# Patient Record
Sex: MALE | Race: WHITE | Employment: FULL TIME | URBAN - METROPOLITAN AREA
[De-identification: names, ages, dates, MRNs, and addresses within clinical notes are randomized per-mention and may not be internally consistent; named-entity substitution may affect disease eponyms.]

---

## 2019-06-22 ENCOUNTER — HOSPITAL ENCOUNTER (EMERGENCY)
Age: 34
Discharge: HOME | End: 2019-06-22
Attending: EMERGENCY MEDICINE
Payer: COMMERCIAL

## 2019-06-22 VITALS
HEIGHT: 72 IN | SYSTOLIC BLOOD PRESSURE: 132 MMHG | RESPIRATION RATE: 16 BRPM | BODY MASS INDEX: 28.44 KG/M2 | HEART RATE: 72 BPM | OXYGEN SATURATION: 100 % | TEMPERATURE: 97.8 F | WEIGHT: 210 LBS | DIASTOLIC BLOOD PRESSURE: 89 MMHG

## 2019-06-22 DIAGNOSIS — G89.29 CHRONIC RIGHT-SIDED LOW BACK PAIN WITH RIGHT-SIDED SCIATICA: Primary | ICD-10-CM

## 2019-06-22 DIAGNOSIS — M54.41 CHRONIC RIGHT-SIDED LOW BACK PAIN WITH RIGHT-SIDED SCIATICA: Primary | ICD-10-CM

## 2019-06-22 PROCEDURE — 96372 THER/PROPH/DIAG INJ SC/IM: CPT

## 2019-06-22 PROCEDURE — 6360000002 HC RX W HCPCS: Performed by: EMERGENCY MEDICINE

## 2019-06-22 PROCEDURE — 6370000000 HC RX 637 (ALT 250 FOR IP): Performed by: EMERGENCY MEDICINE

## 2019-06-22 PROCEDURE — 99283 EMERGENCY DEPT VISIT LOW MDM: CPT

## 2019-06-22 RX ORDER — ORPHENADRINE CITRATE 30 MG/ML
60 INJECTION INTRAMUSCULAR; INTRAVENOUS ONCE
Status: COMPLETED | OUTPATIENT
Start: 2019-06-22 | End: 2019-06-22

## 2019-06-22 RX ORDER — METHYLPREDNISOLONE 4 MG/1
TABLET ORAL
Qty: 1 KIT | Refills: 0 | Status: SHIPPED | OUTPATIENT
Start: 2019-06-22

## 2019-06-22 RX ORDER — CYCLOBENZAPRINE HCL 10 MG
10 TABLET ORAL 2 TIMES DAILY PRN
Qty: 20 TABLET | Refills: 0 | Status: SHIPPED | OUTPATIENT
Start: 2019-06-22 | End: 2019-07-02

## 2019-06-22 RX ORDER — OXYCODONE HYDROCHLORIDE AND ACETAMINOPHEN 5; 325 MG/1; MG/1
1 TABLET ORAL ONCE
Status: COMPLETED | OUTPATIENT
Start: 2019-06-22 | End: 2019-06-22

## 2019-06-22 RX ORDER — LIDOCAINE 4 G/G
1 PATCH TOPICAL DAILY
Qty: 30 PATCH | Refills: 0 | Status: SHIPPED | OUTPATIENT
Start: 2019-06-22 | End: 2019-07-22

## 2019-06-22 RX ORDER — LIDOCAINE 4 G/G
1 PATCH TOPICAL ONCE
Status: DISCONTINUED | OUTPATIENT
Start: 2019-06-22 | End: 2019-06-22 | Stop reason: HOSPADM

## 2019-06-22 RX ADMIN — ORPHENADRINE CITRATE 60 MG: 30 INJECTION INTRAMUSCULAR; INTRAVENOUS at 17:48

## 2019-06-22 RX ADMIN — OXYCODONE HYDROCHLORIDE AND ACETAMINOPHEN 1 TABLET: 5; 325 TABLET ORAL at 17:48

## 2019-06-22 ASSESSMENT — ENCOUNTER SYMPTOMS
VOICE CHANGE: 0
BOWEL INCONTINENCE: 0
ABDOMINAL SWELLING: 0
BACK PAIN: 1
TROUBLE SWALLOWING: 0
SHORTNESS OF BREATH: 0
WHEEZING: 0

## 2019-06-22 ASSESSMENT — PAIN SCALES - GENERAL
PAINLEVEL_OUTOF10: 9
PAINLEVEL_OUTOF10: 9
PAINLEVEL_OUTOF10: 10

## 2019-06-22 NOTE — ED NOTES
Pt able to sit up/maneuver to standing position take some steps around the room. Pt states \"I'm still in a lot of pain\". Dr Margaret Alpers made aware.      Jame Alvarenga, RAYMONDN  33/18/84 [id number]

## 2019-06-22 NOTE — ED NOTES
Pt scripts x3 instructed to follow up with [facility].  Assessed per Dr Bryan Barba, KENJI  76/01/07 [id number]

## 2019-06-22 NOTE — ED PROVIDER NOTES
[address] [facility]  ED  eMERGENCY dEPARTMENT eNCOUnter      Pt Name: Elisha Ryder  MRN: [id number]  [place] 1985  Date of evaluation: 6/22/2019  Provider: Angélica Jha MD    CHIEF COMPLAINT       Chief Complaint   Patient presents with    Back Pain     Pt sts he had chronic back pain, gets steroid injections at [facility]. Sts he went to  something today, tweaked his back, laid down and was unable to get up. HISTORY OF PRESENT ILLNESS   (Location/Symptom, Timing/Onset, Context/Setting, Quality, Duration, Modifying Factors, Severity)  Note limiting factors. The history is provided by the patient. Back Pain   Location:  Lumbar spine (right sided)  Quality: \"sharp\"  Radiates to:  R thigh  Pain severity:  Severe  Pain is: Worse during the day  Onset quality:  Sudden  Duration:  6 hours  Timing:  Constant  Progression:  Worsening  Chronicity:  Recurrent  Context comment:  Chronic back pain. Bent over to pick something up and felt sudden pain  Relieved by:  Nothing  Worsened by:  Bending  Ineffective treatments: 800mg of Ibuprofen x 2. Associated symptoms: no abdominal swelling, no bladder incontinence, no bowel incontinence, no chest pain, no fever and no perianal numbness        Nursing Notes were reviewed. REVIEW OFSYSTEMS    (2-9 systems for level 4, 10 or more for level 5)     Review of Systems   Constitutional: Negative for fever. HENT: Negative for drooling, trouble swallowing and voice change. Eyes: Negative for visual disturbance. Respiratory: Negative for shortness of breath and wheezing. Cardiovascular: Negative for chest pain and palpitations. Gastrointestinal: Negative for bowel incontinence. Genitourinary: Negative for bladder incontinence. Musculoskeletal: Positive for back pain. Neurological: Negative for seizures and syncope. Psychiatric/Behavioral: Negative for self-injury and suicidal ideas.        Except as noted above the °F (36.6 °C) Oral 72 16 100 % 6' (1.829 m) 210 lb (95.3 kg)       Physical Exam   Constitutional: He is oriented to person, place, and time. He appears well-developed and well-nourished. No distress. HENT:   Head: Normocephalic and atraumatic. Eyes: Conjunctivae and EOM are normal.   Neck: No JVD present. No tracheal deviation present. Cardiovascular: Normal rate and intact distal pulses. Pulmonary/Chest: Effort normal. No respiratory distress. Abdominal: Soft. Bowel sounds are normal. There is no tenderness (no tenderness to abdomen on palpation). Musculoskeletal: He exhibits tenderness (right paraspinal lumbar tenderness. No midline tenderness or rash to suggest shingles). He exhibits no edema or deformity. Neurological: He is alert and oriented to person, place, and time. No saddle anesthesia    Skin: Skin is warm and dry. Capillary refill takes less than 2 seconds. Nursing note and vitals reviewed. EMERGENCY DEPARTMENT COURSE and DIFFERENTIAL DIAGNOSIS/MDM:   Vitals:    Vitals:    06/22/19 1728 06/22/19 1834   BP: (!) 110/58 132/89   Pulse: 72    Resp: 16    Temp: 97.8 °F (36.6 °C)    TempSrc: Oral    SpO2: 100%    Weight: 210 lb (95.3 kg)    Height: 6' (1.829 m)          MDM  The patient is afebrile, nontoxic-appearing, in no acute distress. No red flag symptoms such as fever, midline pain, acute trauma, IV drug abuse, urinary or bowel incontinence, saddle anesthesia. After pain medication the patient is able to ambulate although he does still report pain. I suspect likely muscular skeletal back pain. I do not suspect acute emergent cause of back pain such as central cord compression, epidural abscess, vertebral fracture, epidural abscess, AAA, or other emergent pathology. Suspect likely muscle skeletal back pain with sciatica based on history and physical exam.  I feel the patient is appropriate for muscle relaxers, Lidoderm patches, and appropriate dose NSAIDs.   I have spoken to the patient about his appropriate dose of ibuprofen being 800 mg every 8 hours with plenty of food and have strongly recommend he not take further NSAIDs in addition to this. The patient is also referred to a chronic back pain specialist.  Standard ER return precautions given for any new or worsening symptoms. The patient expresses understanding and agreement with this plan and is discharged home. I estimate there is low risk for Abdominal aortic aneurysm, cauda equina syndrome, epidural mass lesion, thus I consider the discharge disposition reasonable. We have discussed the diagnosis and risks, and we agree with discharging home to follow-up with their primary doctor. We also discussed returning to the Emergency Department immediately if new or worsening symptoms occur. We have discussed the symptoms which are most concerning (e.g., saddle anesthesia, urinary or bowel incontinence or retention, changing or worsening pain) that necessitate immediate return. Procedures    FINAL IMPRESSION      1. Chronic right-sided low back pain with right-sided sciatica          DISPOSITION/PLAN   DISPOSITION Decision To Discharge 06/22/2019 06:36:15 PM      PATIENT REFERRED TO:  Rupa Victoria MD  [address] [id number] [phone]    In 1 week      [facility]  ED  [facility] [address]  [phone]    If symptoms worsen      DISCHARGE MEDICATIONS:  New Prescriptions    CYCLOBENZAPRINE (FLEXERIL) 10 MG TABLET    Take 1 tablet by mouth 2 times daily as needed for Muscle spasms    LIDOCAINE 4 % EXTERNAL PATCH    Place 1 patch onto the skin daily    METHYLPREDNISOLONE (MEDROL, JAVI,) 4 MG TABLET    Take by mouth. (Please note that portions of this note were completed with a voice recognition program.  Efforts were made to edit the dictations but occasionally words aremis-transcribed. )    Dirk Brice MD (electronically signed)  Attending Emergency